# Patient Record
Sex: MALE | Race: BLACK OR AFRICAN AMERICAN
[De-identification: names, ages, dates, MRNs, and addresses within clinical notes are randomized per-mention and may not be internally consistent; named-entity substitution may affect disease eponyms.]

---

## 2019-09-25 ENCOUNTER — HOSPITAL ENCOUNTER (EMERGENCY)
Dept: HOSPITAL 74 - JERFT | Age: 44
Discharge: HOME | End: 2019-09-25
Payer: COMMERCIAL

## 2019-09-25 VITALS — HEART RATE: 99 BPM | DIASTOLIC BLOOD PRESSURE: 74 MMHG | TEMPERATURE: 98.8 F | SYSTOLIC BLOOD PRESSURE: 135 MMHG

## 2019-09-25 VITALS — BODY MASS INDEX: 26.6 KG/M2

## 2019-09-25 DIAGNOSIS — F99: ICD-10-CM

## 2019-09-25 DIAGNOSIS — F17.210: ICD-10-CM

## 2019-09-25 DIAGNOSIS — B02.9: Primary | ICD-10-CM

## 2019-09-25 NOTE — PDOC
History of Present Illness





- General


Chief Complaint: Eye Problem


Stated Complaint: LT. EYE RASH


Time Seen by Provider: 09/25/19 13:02


History Source: Patient


Exam Limitations: No Limitations





Past History





- Travel


Traveled outside of the country in the last 30 days: No


Close contact w/someone who was outside of country & ill: No





- Past Medical History


Allergies/Adverse Reactions: 


 Allergies











Allergy/AdvReac Type Severity Reaction Status Date / Time


 


No Known Allergies Allergy   Verified 09/25/19 13:10











Home Medications: 


Ambulatory Orders





Abacavir/Dolutegravir/Lamivudi [Triumeq Tablet] 1 each PO DAILY #30 tablet 06/06 /17 


Valacyclovir HCl [Valtrex -] 1,000 mg PO TID #21 tablet 09/25/19 


Valacyclovir HCl [Valtrex -] 1,000 mg PO TID #21 tablet 09/25/19 








Anemia: No


Asthma: No


Cancer: No


Cardiac Disorders: No


CVA: No


COPD: No


CHF: No


Dementia: No


Diabetes: No


GI Disorders: No


 Disorders: No


HTN: No


Hypercholesterolemia: No


Liver Disease: No


Psychiatric Problems: Yes ((+) "bad temper" in school)


Seizures: No


Thyroid Disease: No





- Surgical History


Abdominal Surgery: No


Appendectomy: No


Cardiac Surgery: No


Cholecystectomy: No


Lung Surgery: No


Neurologic Surgery: No


Orthopedic Surgery: No





- Psycho Social/Smoking Cessation Hx


Smoking History: Current every day smoker


Have you smoked in the past 12 months: Yes


Number of Cigarettes Smoked Daily: 10


Cigars Per Day: 0


Information on smoking cessation initiated: No


'Breaking Loose' booklet given: 08/14/15


Hx Alcohol Use: No


Drug/Substance Use Hx: Yes


Substance Use Type: None


Hx Substance Use Treatment: No





**Review of Systems





- Review of Systems


Able to Perform ROS?: Yes


Comments:: 





09/25/19 17:56


CONSTITUTIONAL: 


Absent: fever, chills, diaphoresis, generalized weakness, malaise, loss of 

appetite


HEENT: 


Absent: rhinorrhea, nasal congestion, throat pain, throat swelling, difficulty 

swallowing, mouth swelling, ear pain, eye pain, visual Changes


CARDIOVASCULAR: 


Absent: chest pain, loss of consciousness, palpitations, irregular heart rate, 

peripheral edema


RESPIRATORY: 


Absent: cough, shortness of breath, dyspnea with exertion, orthopnea, wheezing, 

stridor, hemoptysis


GASTROINTESTINAL:


Absent: abdominal pain, abdominal distension, nausea, vomiting, diarrhea, 

constipation, melena, hematochezia


GENITOURINARY: 


Absent: dysuria, frequency, urgency, hesitancy, hematuria, flank pain, genital 

pain


MUSCULOSKELETAL: 


Absent: myalgia, arthralgia, joint swelling


SKIN: 


Present: rash Absent: itching, pallor


HEMATOLOGIC/IMMUNOLOGIC: 


Absent: easy bleeding, easy bruising, lymphadenopathy, frequent infections


ENDOCRINE:


Absent: unexplained weight gain, unexplained weight loss, heat intolerance, 

cold intolerance


NEUROLOGIC: 


Absent: headache, focal weakness or paresthesias, dizziness, unsteady gait, 

seizure, mental status changes, bladder or bowel incontinence


PSYCHIATRIC: 


Absent: anxiety, depression, suicidal or homicidal ideation, hallucinations.





Is the patient limited English proficient: No





*Physical Exam





- Vital Signs


 Last Vital Signs











Temp Pulse Resp BP Pulse Ox


 


 98.8 F   99 H  16   135/74   97 


 


 09/25/19 13:07  09/25/19 13:07  09/25/19 13:07  09/25/19 13:07  09/25/19 13:07














- Physical Exam


Comments: 





09/25/19 17:57


GENERAL:


Well developed, well nourished. Awake and alert. No acute distress.


HEENT:


Normocephalic, atraumatic. PERRLA, EOMI. No conjunctival pallor. Sclera are non-

icteric. Moist mucous membranes. Oropharynx is clear.


NECK: 


Supple. Full ROM. No JVD. Carotid pulses 2+ and symmetric, without bruits. No 

thyromegaly. No lymphadenopathy.


MUSCULOSKELETAL 


Normal range of motion at all joints. No bony deformities or tenderness. No CVA 

tenderness.


EXTREMITIES: 


No cyanosis. No clubbing. No edema. No calf tenderness.


SKIN: 


Vesicular lesions to the left nasal bridge not crossing the midline. Warm and 

dry. Normal capillary refill.  No jaundice. 


NEUROLOGICAL: 


Alert, awake, appropriate. Cranial nerves 2-12 intact. No deficits to light 

touch and temperature in face, upper extremities and lower extremities. No 

motor deficits in the in face, upper extremities and lower extremities. 

Normoreflexic in the upper and lower extremities. Normal speech. Toes are down-

going bilaterally. Gait is normal without ataxia.


PSYCHIATRIC: 


Cooperative. Good eye contact. Appropriate mood and affect.





Medical Decision Making





- Medical Decision Making





09/25/19 17:58


The patient is a 44-year-old male past medical history of HIV, presents to the 

ER today for a rash to his left nose.  He states it started 2 days ago.  He 

notes that it is itchy and it started as a burning sensation.  He states he 

gets shingles a proximally once every 5 years. Denies fevers, chills, nausea, 

visual changes, and blurry vision.





A/P: Shingles


On exam patient with vesicular rash to the left nasal bridge not crossing the 

midline.


This rash is consistent with shingles.


The left eye was fluorescein stained.  No abrasions noted.


Visual acuity intact 20/20 OS, OD, OU


Discharge home on Valtrex 1 g 3 times a day for 1 week.  Ophthalmology referral 

given.  Explained to patient that it is very important to follow up with 

ophthalmology as if the shingles close to his eye, he could become blind.


I discussed the physical exam findings, ancillary test results and final 

diagnoses with the patient. I answered all of the patient's questions. The 

patient was satisfied with the care received and felt comfortable with the 

discharge plan and treatment plan.  The Patient agrees to follow up with the 

primary care physician/specialist within 24-72 hours. Return precautions were 

given.





Discharge





- Discharge Information


Problems reviewed: Yes


Clinical Impression/Diagnosis: 


Shingles


Qualifiers:


 Herpes zoster complications: without complications Qualified Code(s): B02.9 - 

Zoster without complications





Condition: Stable


Disposition: HOME





- Admission


No





- Additional Discharge Information


Prescriptions: 


Valacyclovir HCl [Valtrex -] 1,000 mg PO TID #21 tablet


Valacyclovir HCl [Valtrex -] 1,000 mg PO TID #21 tablet





- Follow up/Referral


Referrals: 


Jasper Evans MD [Staff Physician] - 


Donny Rossi MD [Staff Physician] - 


Anna Wilkerson MD [Staff Physician] - 





- Patient Discharge Instructions


Patient Printed Discharge Instructions:  DI for Shingles


Additional Instructions: 


You were evaluated for your rash today


You most likely have shingles


Please take the Valtrex three times a day for one week


Take Tylenol 650mg every 6 hours as needed for pain


Follow up with ophthalmology in the next 1-2 days. Referrals have been attached





Return to the ER for visual changes, headache, fever or if you have any new or 

worsening symptoms





- Post Discharge Activity


Work/Back to School Note:  Back to Work

## 2019-09-25 NOTE — PDOC
Rapid Medical Evaluation


Time Seen by Provider: 09/25/19 13:02


Medical Evaluation: 


 Allergies











Allergy/AdvReac Type Severity Reaction Status Date / Time


 


No Known Allergies Allergy   Verified 08/14/15 09:20











09/25/19 13:02


HPI:Left eye swelling and rash x2 days 





PE: Mild swelling about L eye no appreciable rash which does not cross midline 

but there are some small vesicular lesions on the medial aspect of the nose  





ORDERS: Nothing 





**Discharge Disposition





- Diagnosis


 Rash








- Referrals





- Patient Instructions





- Post Discharge Activity

## 2021-12-26 ENCOUNTER — HOSPITAL ENCOUNTER (EMERGENCY)
Dept: HOSPITAL 74 - JER | Age: 46
Discharge: LEFT BEFORE DISCHARGE | End: 2021-12-26
Payer: COMMERCIAL

## 2021-12-26 VITALS — BODY MASS INDEX: 26.6 KG/M2

## 2021-12-26 VITALS — TEMPERATURE: 99 F | DIASTOLIC BLOOD PRESSURE: 73 MMHG | SYSTOLIC BLOOD PRESSURE: 107 MMHG | HEART RATE: 88 BPM

## 2021-12-26 DIAGNOSIS — M54.14: Primary | ICD-10-CM

## 2021-12-26 DIAGNOSIS — R20.2: ICD-10-CM

## 2021-12-26 LAB
ALBUMIN SERPL-MCNC: 3.9 G/DL (ref 3.4–5)
ALP SERPL-CCNC: 123 U/L (ref 45–117)
ALT SERPL-CCNC: 35 U/L (ref 13–61)
ANION GAP SERPL CALC-SCNC: 6 MMOL/L (ref 8–16)
AST SERPL-CCNC: 26 U/L (ref 15–37)
BASOPHILS # BLD: 2.1 % (ref 0–2)
BILIRUB SERPL-MCNC: 0.2 MG/DL (ref 0.2–1)
BUN SERPL-MCNC: 7.2 MG/DL (ref 7–18)
CALCIUM SERPL-MCNC: 8.9 MG/DL (ref 8.5–10.1)
CHLORIDE SERPL-SCNC: 102 MMOL/L (ref 98–107)
CO2 SERPL-SCNC: 34 MMOL/L (ref 21–32)
CREAT SERPL-MCNC: 0.9 MG/DL (ref 0.55–1.3)
DEPRECATED RDW RBC AUTO: 13.6 % (ref 11.9–15.9)
EOSINOPHIL # BLD: 0.8 % (ref 0–4.5)
GLUCOSE SERPL-MCNC: 95 MG/DL (ref 74–106)
HCT VFR BLD CALC: 42.1 % (ref 35.4–49)
HGB BLD-MCNC: 14.6 GM/DL (ref 11.7–16.9)
LYMPHOCYTES # BLD: 32.4 % (ref 8–40)
MCH RBC QN AUTO: 32.1 PG (ref 25.7–33.7)
MCHC RBC AUTO-ENTMCNC: 34.7 G/DL (ref 32–35.9)
MCV RBC: 92.3 FL (ref 80–96)
MONOCYTES # BLD AUTO: 17.3 % (ref 3.8–10.2)
NEUTROPHILS # BLD: 47.4 % (ref 42.8–82.8)
PLATELET # BLD AUTO: 221 10^3/UL (ref 134–434)
PMV BLD: 6.7 FL (ref 7.5–11.1)
PROT SERPL-MCNC: 7.7 G/DL (ref 6.4–8.2)
RBC # BLD AUTO: 4.56 M/MM3 (ref 4–5.6)
SODIUM SERPL-SCNC: 143 MMOL/L (ref 136–145)
WBC # BLD AUTO: 3.6 K/MM3 (ref 4–10)

## 2021-12-26 PROCEDURE — C9803 HOPD COVID-19 SPEC COLLECT: HCPCS

## 2021-12-26 PROCEDURE — U0003 INFECTIOUS AGENT DETECTION BY NUCLEIC ACID (DNA OR RNA); SEVERE ACUTE RESPIRATORY SYNDROME CORONAVIRUS 2 (SARS-COV-2) (CORONAVIRUS DISEASE [COVID-19]), AMPLIFIED PROBE TECHNIQUE, MAKING USE OF HIGH THROUGHPUT TECHNOLOGIES AS DESCRIBED BY CMS-2020-01-R: HCPCS

## 2021-12-26 PROCEDURE — U0005 INFEC AGEN DETEC AMPLI PROBE: HCPCS

## 2023-10-02 ENCOUNTER — HOSPITAL ENCOUNTER (EMERGENCY)
Dept: HOSPITAL 74 - JERFT | Age: 48
Discharge: HOME | End: 2023-10-02
Payer: COMMERCIAL

## 2023-10-02 VITALS
RESPIRATION RATE: 17 BRPM | DIASTOLIC BLOOD PRESSURE: 74 MMHG | HEART RATE: 89 BPM | SYSTOLIC BLOOD PRESSURE: 116 MMHG | TEMPERATURE: 98.2 F

## 2023-10-02 VITALS — BODY MASS INDEX: 25 KG/M2

## 2023-10-02 DIAGNOSIS — M54.2: ICD-10-CM

## 2023-10-02 DIAGNOSIS — R07.0: Primary | ICD-10-CM

## 2023-10-02 DIAGNOSIS — I89.0: ICD-10-CM

## 2023-10-02 LAB
BASOPHILS # BLD: 1 % (ref 0–2)
DEPRECATED RDW RBC AUTO: 13.3 % (ref 11.9–15.9)
EOSINOPHIL # BLD: 5.6 % (ref 0–4.5)
HCT VFR BLD CALC: 40.1 % (ref 35.4–49)
HGB BLD-MCNC: 13.5 GM/DL (ref 11.7–16.9)
LYMPHOCYTES # BLD: 30.2 % (ref 8–40)
MCH RBC QN AUTO: 32.1 PG (ref 25.7–33.7)
MCHC RBC AUTO-ENTMCNC: 33.7 G/DL (ref 32–35.9)
MCV RBC: 95.1 FL (ref 80–96)
MONOCYTES # BLD AUTO: 9.2 % (ref 3.8–10.2)
NEUTROPHILS # BLD: 54 % (ref 42.8–82.8)
PLATELET # BLD AUTO: 256 10^3/UL (ref 134–434)
PMV BLD: 7.2 FL (ref 7.5–11.1)
RBC # BLD AUTO: 4.21 M/MM3 (ref 4–5.6)
WBC # BLD AUTO: 6.6 K/MM3 (ref 4–10)